# Patient Record
Sex: MALE | Race: WHITE | NOT HISPANIC OR LATINO | Employment: FULL TIME | URBAN - METROPOLITAN AREA
[De-identification: names, ages, dates, MRNs, and addresses within clinical notes are randomized per-mention and may not be internally consistent; named-entity substitution may affect disease eponyms.]

---

## 2017-01-26 ENCOUNTER — OFFICE VISIT (OUTPATIENT)
Dept: SURGERY | Facility: CLINIC | Age: 57
End: 2017-01-26
Payer: COMMERCIAL

## 2017-01-26 VITALS
HEART RATE: 85 BPM | SYSTOLIC BLOOD PRESSURE: 131 MMHG | DIASTOLIC BLOOD PRESSURE: 83 MMHG | WEIGHT: 174.19 LBS | TEMPERATURE: 98 F | BODY MASS INDEX: 26.48 KG/M2

## 2017-01-26 DIAGNOSIS — N63.20 LEFT BREAST MASS: Primary | ICD-10-CM

## 2017-01-26 PROCEDURE — 99999 PR PBB SHADOW E&M-EST. PATIENT-LVL III: CPT | Mod: PBBFAC,,, | Performed by: SURGERY

## 2017-01-26 PROCEDURE — 3079F DIAST BP 80-89 MM HG: CPT | Mod: S$GLB,,, | Performed by: SURGERY

## 2017-01-26 PROCEDURE — 1159F MED LIST DOCD IN RCRD: CPT | Mod: S$GLB,,, | Performed by: SURGERY

## 2017-01-26 PROCEDURE — 3075F SYST BP GE 130 - 139MM HG: CPT | Mod: S$GLB,,, | Performed by: SURGERY

## 2017-01-26 PROCEDURE — 99203 OFFICE O/P NEW LOW 30 MIN: CPT | Mod: S$GLB,,, | Performed by: SURGERY

## 2017-01-26 RX ORDER — DICYCLOMINE HYDROCHLORIDE 20 MG/1
20 TABLET ORAL
COMMUNITY

## 2017-01-26 RX ORDER — TRAZODONE HYDROCHLORIDE 100 MG/1
100 TABLET ORAL
COMMUNITY

## 2017-01-26 RX ORDER — CYCLOSPORINE 0.5 MG/ML
EMULSION OPHTHALMIC
COMMUNITY
Start: 2016-03-05

## 2017-01-26 NOTE — LETTER
January 26, 2017      Gualberto Sexton MD  [address] [place] [id number]           [address] [address]  Phone: [phone]  Fax: [phone]          Patient: Denis Ritter   MR Number: [id number]   YOB: 1960   Date of Visit: 1/26/2017       Dear Dr. Gualberto Sexton:    Thank you for referring Denis Ritter to me for evaluation. Attached you will find relevant portions of my assessment and plan of care.    If you have questions, please do not hesitate to call me. I look forward to following Denis Ritter along with you.    Sincerely,    Louis O. Jeansonne IV, MD    Enclosure  CC:  No Recipients    If you would like to receive this communication electronically, please contact [email or web address] or [phone] to request more information on [employer] Link access.    For providers and/or their staff who would like to refer a patient to [facility], please contact us through our one-stop-shop provider referral line, [facility], at [phone].    If you feel you have received this communication in error or would no longer like to receive these types of communications, please e-mail [email or web address]

## 2017-01-26 NOTE — MR AVS SNAPSHOT
[facility]  [address]  [place] [address]  Phone: [phone]  Fax: [phone]                  Denis Ritter   2017 3:00 PM   Office Visit    Description:  Male : 1960   Provider:  Louis O. Jeansonne IV, MD   Department:  [facility]           Reason for Visit     Lump           Diagnoses this Visit        Comments    Left breast mass    -  Primary            To Do List           Future Appointments        Provider Department Dept Phone    2017 8:45 AM [facility] US[facility] [phone]      Goals (5 Years of Data)     None      [facility]     [facility] Care [facility] -  Assistance  Registered nurses in the [facility] provide clinical advisement, health education, appointment booking, and other advisory services.  Call for this free service at [phone].             Medications           Message regarding Medications     Verify the changes and/or additions to your medication regime listed below are the same as discussed with your clinician today.  If any of these changes or additions are incorrect, please notify your healthcare provider.             Verify that the below list of medications is an accurate representation of the medications you are currently taking.  If none reported, the list may be blank. If incorrect, please contact your healthcare provider. Carry this list with you in case of emergency.           Current Medications     cycloSPORINE (RESTASIS) 0.05 % ophthalmic emulsion     armodafinil (NUVIGIL) 250 mg tablet Take 250 mg by mouth daily as needed.    budesonide 9 mg TaDE Take 9 mg by mouth once daily.    citalopram (CELEXA) 20 MG tablet Take 20 mg by mouth once daily.    clonazepam (KLONOPIN) 0.5 MG tablet Take 0.5 mg by mouth nightly as needed for Anxiety. 1-2 tablets    dicyclomine (BENTYL) 20 mg tablet Take 20 mg by mouth.    diltiazem (DILACOR XR) 240 MG CDCR Take 180  mg by mouth once daily.    fenofibrate micronized (LOFIBRA) 200 MG Cap Take 200 mg by mouth daily with breakfast.    hyoscyamine (LEVBID) 0.375 mg Tb12 Take 0.375 mg by mouth 2 (two) times daily as needed.    imipramine (TOFRANIL) 25 MG tablet Take 25 mg by mouth every evening.    lisinopril (PRINIVIL,ZESTRIL) 40 MG tablet Take 40 mg by mouth once daily.    metoprolol tartrate (LOPRESSOR) 50 MG tablet Take 0.5 tablets (25 mg total) by mouth 2 (two) times daily.    pantoprazole (PROTONIX) 40 MG tablet Take 40 mg by mouth 2 (two) times daily.    rosuvastatin (CRESTOR) 20 MG tablet Take 20 mg by mouth once daily.    trazodone (DESYREL) 100 MG tablet Take 100 mg by mouth.           Clinical Reference Information           Vital Signs - Last Recorded  Most recent update: 1/26/2017  2:52 PM by Bettina Pretty LPN    BP Pulse Temp Wt BMI    131/83 85 98.3 °F (36.8 °C) (Oral) 79 kg (174 lb 2.6 oz) 26.48 kg/m2      Blood Pressure          Most Recent Value    BP  131/83      Allergies as of 1/26/2017     Morphine    Biaxin [Clarithromycin]      Immunizations Administered on Date of Encounter - 1/26/2017     None      Orders Placed During Today's Visit     Future Labs/Procedures Expected by Expires    US Breast Left Complete  1/26/2017 3/26/2018      [facility] Sign-Up     Activating your [facility] account is as easy as 1-2-3!     1) Visit [email or web address], select Sign Up Now, enter this activation code and your date of birth, then select Next.  [email or web address]  Expires: 3/12/2017  3:17 PM      2) Create a username and password to use when you visit [facility] in the future and select a security question in case you lose your password and select Next.    3) Enter your e-mail address and click Sign Up!    Additional Information  If you have questions, please e-mail [email or web address] or call [phone] to talk to our [facility] staff. Remember, [facility] is NOT to be used for urgent needs. For medical emergencies, dial 911.          Smoking Cessation     If you would like to quit smoking:   You may be eligible for free services if you are a [place] resident and started smoking cigarettes before September 1, 1988.  Call the [employer] ([employer]) toll free at (375) 261-6441 or (963) 777-1578.   Call 3-800-QUIT-NOW if you do not meet the above criteria.

## 2017-01-26 NOTE — PROGRESS NOTES
Chief Complaint   Patient presents with    Lump       HISTORY OF PRESENT ILLNESS: Denis Ritter is a 56 y.o. male, c/o left breast mass for about one month.  No nipple discharge, no pain, not getting larger.    REVIEW OF SYSTEMS:  CARDIOVASCULAR: Denies chest pain, PND, orthopnea or reduced exercise tolerance.  RESPIRATORY: Denies REYES, cyanosis, wheezing, cough and sputum production.    Past Medical History   Diagnosis Date    Depression     GERD (gastroesophageal reflux disease)     High cholesterol     High cholesterol     Hypertension     Shoulder arthritis     Ulcerative colitis        Past Surgical History   Procedure Laterality Date    Shoulder arthroscopy      Appendectomy      Cholecystectomy      Back surgery      Lipoma resection      Ulnar nerve repair      Carpal tunnel release         Family History   Problem Relation Age of Onset    Glaucoma Mother     Heart disease Father        Social History     Social History    Marital status:      Spouse name: N/A    Number of children: N/A    Years of education: N/A     Occupational History    Not on file.     Social History Main Topics    Smoking status: Current Every Day Smoker     Packs/day: 1.00    Smokeless tobacco: Not on file    Alcohol use No    Drug use: No    Sexual activity: Yes     Partners: Female     Other Topics Concern    Not on file     Social History Narrative       Current Outpatient Prescriptions   Medication Sig Dispense Refill    cycloSPORINE (RESTASIS) 0.05 % ophthalmic emulsion       armodafinil (NUVIGIL) 250 mg tablet Take 250 mg by mouth daily as needed.      budesonide 9 mg TaDE Take 9 mg by mouth once daily.      citalopram (CELEXA) 20 MG tablet Take 20 mg by mouth once daily.      clonazepam (KLONOPIN) 0.5 MG tablet Take 0.5 mg by mouth nightly as needed for Anxiety. 1-2 tablets      dicyclomine (BENTYL) 20 mg tablet Take 20 mg by mouth.      diltiazem (DILACOR XR) 240 MG CDCR Take 180 mg  by mouth once daily.      fenofibrate micronized (LOFIBRA) 200 MG Cap Take 200 mg by mouth daily with breakfast.      hyoscyamine (LEVBID) 0.375 mg Tb12 Take 0.375 mg by mouth 2 (two) times daily as needed.      imipramine (TOFRANIL) 25 MG tablet Take 25 mg by mouth every evening.      lisinopril (PRINIVIL,ZESTRIL) 40 MG tablet Take 40 mg by mouth once daily.      metoprolol tartrate (LOPRESSOR) 50 MG tablet Take 0.5 tablets (25 mg total) by mouth 2 (two) times daily. 30 tablet 1    pantoprazole (PROTONIX) 40 MG tablet Take 40 mg by mouth 2 (two) times daily.      rosuvastatin (CRESTOR) 20 MG tablet Take 20 mg by mouth once daily.      trazodone (DESYREL) 100 MG tablet Take 100 mg by mouth.       No current facility-administered medications for this visit.        Review of patient's allergies indicates:   Allergen Reactions    Morphine Itching    Biaxin [clarithromycin] Diarrhea       Vitals:    01/26/17 1452   BP: 131/83   Pulse: 85   Temp: 98.3 °F (36.8 °C)       PHYSICAL EXAMINATION:  CONSTITUTIONAL:  Well-nourished, well developed, in no acute distress.  CARDIOVASCULAR:  No extremity edema or varicosities.  RESPIRATORY:   Good respiratory effort.  MUSCULOSKELETAL:  Normal strength and tone in all four extremities.  BREAST:  Left breast there is a large mass behind nipple (4-5 cm, flat)  NEUROLOGIC:  Normal gait.  PSYCH:  Sound judgment and insight, oriented to time, place, and person.    ASSESSMENT/PLAN:    1. Left breast mass  US Breast Left Complete     Mammogram if ultrasound inconclusive.

## 2017-01-31 ENCOUNTER — TELEPHONE (OUTPATIENT)
Dept: RADIOLOGY | Facility: HOSPITAL | Age: 57
End: 2017-01-31

## 2017-02-01 ENCOUNTER — HOSPITAL ENCOUNTER (OUTPATIENT)
Dept: RADIOLOGY | Facility: HOSPITAL | Age: 57
Discharge: HOME | End: 2017-02-01
Attending: SURGERY
Payer: COMMERCIAL

## 2017-02-01 DIAGNOSIS — N63.20 LEFT BREAST MASS: ICD-10-CM

## 2017-02-01 PROCEDURE — 77066 DX MAMMO INCL CAD BI: CPT | Mod: 26,,, | Performed by: RADIOLOGY

## 2017-02-01 PROCEDURE — 77062 BREAST TOMOSYNTHESIS BI: CPT | Mod: 26,,, | Performed by: RADIOLOGY

## 2017-02-01 PROCEDURE — 76642 ULTRASOUND BREAST LIMITED: CPT | Mod: 26,LT,, | Performed by: RADIOLOGY

## 2017-02-01 PROCEDURE — 76642 ULTRASOUND BREAST LIMITED: CPT | Mod: TC,PO,LT

## 2017-02-01 PROCEDURE — 77062 BREAST TOMOSYNTHESIS BI: CPT | Mod: TC

## 2017-02-02 ENCOUNTER — DOCUMENTATION ONLY (OUTPATIENT)
Dept: RADIOLOGY | Facility: HOSPITAL | Age: 57
End: 2017-02-02

## 2017-02-16 ENCOUNTER — TELEPHONE (OUTPATIENT)
Dept: SURGERY | Facility: HOSPITAL | Age: 57
End: 2017-02-16